# Patient Record
Sex: FEMALE | Race: BLACK OR AFRICAN AMERICAN | NOT HISPANIC OR LATINO | Employment: OTHER | URBAN - METROPOLITAN AREA
[De-identification: names, ages, dates, MRNs, and addresses within clinical notes are randomized per-mention and may not be internally consistent; named-entity substitution may affect disease eponyms.]

---

## 2021-07-18 ENCOUNTER — HOSPITAL ENCOUNTER (EMERGENCY)
Facility: HOSPITAL | Age: 71
Discharge: HOME | End: 2021-07-18
Attending: EMERGENCY MEDICINE | Admitting: EMERGENCY MEDICINE

## 2021-07-18 ENCOUNTER — APPOINTMENT (OUTPATIENT)
Dept: CARDIOLOGY | Facility: HOSPITAL | Age: 71
End: 2021-07-18

## 2021-07-18 VITALS
SYSTOLIC BLOOD PRESSURE: 139 MMHG | DIASTOLIC BLOOD PRESSURE: 90 MMHG | TEMPERATURE: 97.7 F | HEART RATE: 76 BPM | HEIGHT: 65 IN | OXYGEN SATURATION: 100 % | RESPIRATION RATE: 18 BRPM

## 2021-07-18 DIAGNOSIS — M77.8 TENDINITIS OF LEFT ELBOW: Primary | ICD-10-CM

## 2021-07-18 LAB
BH CV UPPER VENOUS LEFT AXILLARY AUGMENT: NORMAL
BH CV UPPER VENOUS LEFT AXILLARY COMPETENT: NORMAL
BH CV UPPER VENOUS LEFT AXILLARY COMPRESS: NORMAL
BH CV UPPER VENOUS LEFT AXILLARY PHASIC: NORMAL
BH CV UPPER VENOUS LEFT AXILLARY SPONT: NORMAL
BH CV UPPER VENOUS LEFT BASILIC FOREARM COMPRESS: NORMAL
BH CV UPPER VENOUS LEFT BASILIC UPPER COMPRESS: NORMAL
BH CV UPPER VENOUS LEFT BRACHIAL COMPRESS: NORMAL
BH CV UPPER VENOUS LEFT CEPHALIC FOREARM COMPRESS: NORMAL
BH CV UPPER VENOUS LEFT CEPHALIC UPPER COMPRESS: NORMAL
BH CV UPPER VENOUS LEFT INTERNAL JUGULAR AUGMENT: NORMAL
BH CV UPPER VENOUS LEFT INTERNAL JUGULAR COMPETENT: NORMAL
BH CV UPPER VENOUS LEFT INTERNAL JUGULAR COMPRESS: NORMAL
BH CV UPPER VENOUS LEFT INTERNAL JUGULAR PHASIC: NORMAL
BH CV UPPER VENOUS LEFT INTERNAL JUGULAR SPONT: NORMAL
BH CV UPPER VENOUS LEFT RADIAL COMPRESS: NORMAL
BH CV UPPER VENOUS LEFT SUBCLAVIAN AUGMENT: NORMAL
BH CV UPPER VENOUS LEFT SUBCLAVIAN COMPETENT: NORMAL
BH CV UPPER VENOUS LEFT SUBCLAVIAN COMPRESS: NORMAL
BH CV UPPER VENOUS LEFT SUBCLAVIAN PHASIC: NORMAL
BH CV UPPER VENOUS LEFT SUBCLAVIAN SPONT: NORMAL
BH CV UPPER VENOUS LEFT ULNAR COMPRESS: NORMAL
BH CV UPPER VENOUS RIGHT INTERNAL JUGULAR AUGMENT: NORMAL
BH CV UPPER VENOUS RIGHT INTERNAL JUGULAR COMPETENT: NORMAL
BH CV UPPER VENOUS RIGHT INTERNAL JUGULAR COMPRESS: NORMAL
BH CV UPPER VENOUS RIGHT INTERNAL JUGULAR PHASIC: NORMAL
BH CV UPPER VENOUS RIGHT INTERNAL JUGULAR SPONT: NORMAL
BH CV UPPER VENOUS RIGHT SUBCLAVIAN AUGMENT: NORMAL
BH CV UPPER VENOUS RIGHT SUBCLAVIAN COMPETENT: NORMAL
BH CV UPPER VENOUS RIGHT SUBCLAVIAN COMPRESS: NORMAL
BH CV UPPER VENOUS RIGHT SUBCLAVIAN PHASIC: NORMAL
BH CV UPPER VENOUS RIGHT SUBCLAVIAN SPONT: NORMAL

## 2021-07-18 PROCEDURE — 99283 EMERGENCY DEPT VISIT LOW MDM: CPT

## 2021-07-18 PROCEDURE — 93971 EXTREMITY STUDY: CPT

## 2021-07-18 NOTE — ED TRIAGE NOTES
Pt has mass to left A/C region on arm x 4 days, pt went to [facility] told to come to ER to rule out DVT. Pt denies hx of dvt or injury.

## 2021-07-18 NOTE — ED NOTES
Patient presents to ED from home, notes she went to [facility] and was told to come to ED for further evaluation in regards to left arm pain, and mild swelling to medical side of LUE fold.  Patient notes pain for 4 days.  Notes trip to [place] x1 month ago.  Patient denies any recent peripheral sticks, denies any known injury.  Denies any numbness or tingling. Pulses are strong and equal bilaterally.  Denies shortness of breath.  ABC's intact.  Denies any blood thinners.  VSS.  Notes she did not take prescribed blood pressure medication today.  B/P is 139/90.  Patient is unable to recall names of medications nor dosages.  Patient asked if she could find out said medication information.  Patient informed to call staff when she knew.  NAD noted. Patient wearing mask, nurse wearing mask and protective eyewear during care and assessment.  Hand hygiene performed prior to and post care.      Luke Monsivais RN  07/18/21 3637

## 2021-07-18 NOTE — ED PROVIDER NOTES
EMERGENCY DEPARTMENT ENCOUNTER    Room Number:  09/09  Date of encounter:  7/18/2021  PCP: No primary care provider on file.  Historian: Pt    Patient was placed in face mask during triage process. Patient was wearing facemask when I entered the room and throughout our encounter. I wore full protective equipment throughout this patient encounter including a face mask, eye protection, and gloves. Hand hygiene was performed before donning protective equipment and again following doffing of PPE after leaving the room.    HPI:  Chief Complaint: Left arm pain  A complete HPI/ROS/PMH/PSH/SH/FH are unobtainable due to: N/A   Context: Lakia Light is a 70 y.o. female who presents to the ED c/o gradual developing left arm pain overlying the medial portion of the left antecubital fossa/elbow.  This is been gradually developing over 3 days.  No known trauma.  No erythema.  No radiation proximally or distally.  She denies any fevers or open wounds.  No change in motor or sensory of the left upper extremity.  Patient denies fever, cough, chest pain, shortness of breath.  No history of similar.  Discomfort currently 5 out of 10.  No clear exacerbating or relieving factors other than it hurts to touch.    MEDICAL HISTORY REVIEW  History hypertension.  No prior history cancer, DVT, blood dyscrasia.    PAST MEDICAL HISTORY  Active Ambulatory Problems     Diagnosis Date Noted   • No Active Ambulatory Problems     Resolved Ambulatory Problems     Diagnosis Date Noted   • No Resolved Ambulatory Problems     No Additional Past Medical History         PAST SURGICAL HISTORY  No past surgical history on file.      FAMILY HISTORY  No family history on file.      SOCIAL HISTORY         ALLERGIES  Patient has no known allergies.        REVIEW OF SYSTEMS  Review of Systems     All systems reviewed and negative except for those discussed in HPI.       PHYSICAL EXAM    I have reviewed the triage vital signs and nursing notes.    ED Triage Vitals  [07/18/21 1820]   Temp Heart Rate Resp BP SpO2   97.7 °F (36.5 °C) 76 18 -- 100 %      Temp src Heart Rate Source Patient Position BP Location FiO2 (%)   Tympanic -- -- -- --       Physical Exam    Physical Exam   Constitutional: No distress.   HENT:  Head: Normocephalic and atraumatic.   Oropharynx: Mucous membranes are moist.   Eyes: No scleral icterus. No conjunctival pallor.  Neck: Painless range of motion noted. Neck supple.   Cardiovascular: Normal rate, regular rhythm and intact distal pulses.  Pulmonary/Chest: No respiratory distress. There are no wheezes, no rhonchi, and no rales.    Musculoskeletal: Soft tissue tenderness with no significant induration or erythema overlying the medial portion of left AC.  No palpable cord.  Normal left axilla.  Normal external findings of the left upper extremity as compared to the right with no swelling, erythema, or limitations in range of motion.  Neurological: Alert.  Baseline strength and sensation noted.   Skin: Skin is pink, warm, and dry. No pallor.   Psychiatric: Mood and affect normal.   Nursing note and vitals reviewed.    LAB RESULTS  Recent Results (from the past 24 hour(s))   Duplex venous upper extremity left    Collection Time: 07/18/21  7:16 PM   Result Value Ref Range    Right Internal Jugular Augment Y     Right Internal Jugular Competent Y     Right Internal Jugular Compress C     Right Internal Jugular Phasic Y     Right Internal Jugular Spont Y     Left Internal Jugular Augment Y     Left Internal Jugular Competent Y     Left Internal Jugular Compress C     Left Internal Jugular Phasic Y     Left Internal Jugular Spont Y     Right Subclavian Augment Y     Right Subclavian Competent Y     Right Subclavian Compress C     Right Subclavian Phasic Y     Right Subclavian Spont Y     Left Subclavian Augment Y     Left Subclavian Competent Y     Left Subclavian Compress C     Left Subclavian Phasic Y     Left Subclavian Spont Y     Left Axillary Augment Y      Left Axillary Competent Y     Left Axillary Compress C     Left Axillary Phasic Y     Left Axillary Spont Y     Left Brachial Compress C     Left Radial Compress C     Left Ulnar Compress C     Left Basilic Upper Compress C     Left Basilic Forearm Compress C     Left Cephalic Upper Compress C     Left Cephalic Forearm Compress C        Ordered the above labs and independently reviewed the results.        RADIOLOGY  No Radiology Exams Resulted Within Past 24 Hours    I ordered the above noted radiological studies. Reviewed by me and discussed with radiologist.  See dictation for official radiology interpretation.      PROCEDURES    Procedures        MEDICATIONS GIVEN IN ER    Medications - No data to display      PROGRESS, DATA ANALYSIS, CONSULTS, AND MEDICAL DECISION MAKING    My differential diagnosis of the upper extremity includes but is not limited to contusions of the shoulder, forearm, arm, wrist, elbow or hand, dislocations of shoulder, elbow, wrist, digits, shoulder sprain, elbow sprain, wrist sprain, digit sprain, shoulder strain, arm strain, forearm strain, elbow strain, wrist strain, hand sprain, digit strain, lacerations of the upper extremity, fractures both closed and open of radius, ulna and humerus, cellulitis or abscess, cervical radiculopathy, radial nerve palsy, neurogenic upper extremity pain.      All labs have been independently reviewed by me.  All radiology studies have been reviewed by me and discussed with radiologist dictating the report.   EKG's independently viewed and interpreted by me.  Discussion below represents my analysis of pertinent findings related to patient's condition, differential diagnosis, treatment plan and final disposition.      ED Course as of Jul 18 1933   Sun Jul 18, 2021   1840 Patient declines offer for analgesic    [RS]   1930 Late entry:  Preliminary duplex left upper extremity result from vascular tech, Alondra-negative for DVT and SVT.    [RS]   1930 Patient  updated with findings.  Recommend anti-inflammatory therapy with topical diclofenac gel and light compression with Ace wrap.  Reviewed red flags which would indicate need for reevaluation.  Patient expresses understanding agreement with plan.    [RS]      ED Course User Index  [RS] Natan Lorenzana MD       AS OF 19:33 EDT VITALS:    BP - 139/90  HR - 76  TEMP - 97.7 °F (36.5 °C) (Tympanic)  O2 SATS - 100%        DIAGNOSIS  Final diagnoses:   Tendinitis of left elbow         DISPOSITION  DISCHARGE    Patient discharged in stable condition.    Reviewed implications of results, diagnosis, meds, responsibility to follow up, warning signs and symptoms of possible worsening, potential complications and reasons to return to ER.    Patient/Family voiced understanding of above instructions.    Discussed plan for discharge, as there is no emergent indication for admission. Patient referred to primary care provider for regular health maintenance. Pt/family is agreeable and understands need for follow up and possible repeat testing.  Pt is aware that discharge does not mean that nothing is wrong but it indicates no emergency is present that requires admission and they must continue care with follow-up as given below or physician of their choice.     FOLLOW-UP  Your primary care provider    Schedule an appointment as soon as possible for a visit in 3 days  If symptoms fail to improve    [facility] Emergency Department  [address] [facility] [address] [phone]  Go to   As needed, If symptoms worsen         Medication List      New Prescriptions    Diclofenac Sodium 1 % gel gel  Commonly known as: VOLTAREN  Apply 4 g topically to the appropriate area as directed 2 (Two) Times a Day.           Where to Get Your Medications      You can get these medications from any pharmacy    Bring a paper prescription for each of these medications  · Diclofenac Sodium 1 % gel gel            Natan Lorenzana,  MD  07/18/21 4783

## 2022-07-25 ENCOUNTER — HOSPITAL ENCOUNTER (EMERGENCY)
Facility: HOSPITAL | Age: 72
Discharge: HOME | End: 2022-07-25
Attending: EMERGENCY MEDICINE | Admitting: EMERGENCY MEDICINE

## 2022-07-25 ENCOUNTER — APPOINTMENT (OUTPATIENT)
Dept: CARDIOLOGY | Facility: HOSPITAL | Age: 72
End: 2022-07-25

## 2022-07-25 VITALS
DIASTOLIC BLOOD PRESSURE: 79 MMHG | RESPIRATION RATE: 16 BRPM | HEIGHT: 65 IN | HEART RATE: 65 BPM | TEMPERATURE: 97.7 F | WEIGHT: 198 LBS | BODY MASS INDEX: 32.99 KG/M2 | SYSTOLIC BLOOD PRESSURE: 126 MMHG | OXYGEN SATURATION: 98 %

## 2022-07-25 DIAGNOSIS — M79.604 ACUTE PAIN OF RIGHT LOWER EXTREMITY: Primary | ICD-10-CM

## 2022-07-25 LAB
BH CV LOWER VASCULAR LEFT COMMON FEMORAL AUGMENT: NORMAL
BH CV LOWER VASCULAR LEFT COMMON FEMORAL COMPETENT: NORMAL
BH CV LOWER VASCULAR LEFT COMMON FEMORAL COMPRESS: NORMAL
BH CV LOWER VASCULAR LEFT COMMON FEMORAL PHASIC: NORMAL
BH CV LOWER VASCULAR LEFT COMMON FEMORAL SPONT: NORMAL
BH CV LOWER VASCULAR RIGHT COMMON FEMORAL AUGMENT: NORMAL
BH CV LOWER VASCULAR RIGHT COMMON FEMORAL COMPETENT: NORMAL
BH CV LOWER VASCULAR RIGHT COMMON FEMORAL COMPRESS: NORMAL
BH CV LOWER VASCULAR RIGHT COMMON FEMORAL PHASIC: NORMAL
BH CV LOWER VASCULAR RIGHT COMMON FEMORAL SPONT: NORMAL
BH CV LOWER VASCULAR RIGHT DISTAL FEMORAL COMPRESS: NORMAL
BH CV LOWER VASCULAR RIGHT GASTRONEMIUS COMPRESS: NORMAL
BH CV LOWER VASCULAR RIGHT GREATER SAPH AK COMPRESS: NORMAL
BH CV LOWER VASCULAR RIGHT GREATER SAPH BK COMPRESS: NORMAL
BH CV LOWER VASCULAR RIGHT LESSER SAPH COMPRESS: NORMAL
BH CV LOWER VASCULAR RIGHT MID FEMORAL AUGMENT: NORMAL
BH CV LOWER VASCULAR RIGHT MID FEMORAL COMPETENT: NORMAL
BH CV LOWER VASCULAR RIGHT MID FEMORAL COMPRESS: NORMAL
BH CV LOWER VASCULAR RIGHT MID FEMORAL PHASIC: NORMAL
BH CV LOWER VASCULAR RIGHT MID FEMORAL SPONT: NORMAL
BH CV LOWER VASCULAR RIGHT PERONEAL COMPRESS: NORMAL
BH CV LOWER VASCULAR RIGHT POPLITEAL AUGMENT: NORMAL
BH CV LOWER VASCULAR RIGHT POPLITEAL COMPETENT: NORMAL
BH CV LOWER VASCULAR RIGHT POPLITEAL COMPRESS: NORMAL
BH CV LOWER VASCULAR RIGHT POPLITEAL PHASIC: NORMAL
BH CV LOWER VASCULAR RIGHT POPLITEAL SPONT: NORMAL
BH CV LOWER VASCULAR RIGHT POSTERIOR TIBIAL COMPRESS: NORMAL
BH CV LOWER VASCULAR RIGHT PROFUNDA FEMORAL COMPRESS: NORMAL
BH CV LOWER VASCULAR RIGHT PROXIMAL FEMORAL COMPRESS: NORMAL
BH CV LOWER VASCULAR RIGHT SAPHENOFEMORAL JUNCTION COMPRESS: NORMAL
MAXIMAL PREDICTED HEART RATE: 149 BPM
STRESS TARGET HR: 127 BPM

## 2022-07-25 PROCEDURE — 99283 EMERGENCY DEPT VISIT LOW MDM: CPT

## 2022-07-25 PROCEDURE — 93971 EXTREMITY STUDY: CPT

## 2022-07-25 RX ORDER — ACETAMINOPHEN 500 MG
1000 TABLET ORAL ONCE
Status: COMPLETED | OUTPATIENT
Start: 2022-07-25 | End: 2022-07-25

## 2022-07-25 RX ORDER — NAPROXEN 500 MG/1
500 TABLET ORAL 2 TIMES DAILY WITH MEALS
Qty: 20 TABLET | Refills: 0 | Status: SHIPPED | OUTPATIENT
Start: 2022-07-25

## 2022-07-25 RX ADMIN — ACETAMINOPHEN 1000 MG: 500 TABLET ORAL at 17:42

## 2022-07-25 NOTE — DISCHARGE INSTRUCTIONS
Try ice on and off to right thigh    Avoid heating pad    Follow up with Primary Care    Return Precautions    Although you are being discharged from the ED today, I encourage you to return for worsening symptoms.  Things can, and do, change such that treatment at home with medication may not be adequate.      Specifically, return for any of the following:    Chest pain, shortness of breath, pain or nausea and vomiting not controlled by medications provided.    Please make a follow up with your Primary Care Provider for a blood pressure recheck.

## 2022-07-25 NOTE — ED NOTES
"Pt reports cramping pains from R ankle up to R thigh x \"a while\" worse in the last few days. States has not had pains \"quite like this before'. Not on blood thinners. Jaycee CP, SOA. able to ambulate independently, NKI. Pt does report + recent travel.   "

## 2022-07-25 NOTE — PROGRESS NOTES
Preliminary negative results of today's right lower extremity venous duplex were called to [facility]ao.

## 2022-07-25 NOTE — ED TRIAGE NOTES
Pt to ER from home. Ambulatory to triage desk. Complains of R thigh and calf pain. No known injury. Pain started yesterday evening, worsening today.     Pt wearing mask, this RN in PPE

## 2022-07-25 NOTE — ED PROVIDER NOTES
EMERGENCY DEPARTMENT ENCOUNTER    Room Number:  B10/10  Date seen:  7/25/2022  Time seen: 17:17 EDT  PCP: Nia Mason MD  Historian: pt    HPI:  Chief complaint:right thigh and calf pain  A complete HPI/ROS/PMH/PSH/SH/FH are unobtainable due to: n/a  Context:Lakia Light is a 71 y.o. female with past medical history significant for hypertension who presents to the ED with c/o 1 day of pain to the right thigh and calf with no known injury.  She reports that she is able to ambulate but sometimes she feels as though the leg is catching.  She has not tried anything at home for the pain.  She reports it almost feels muscular in nature but she also endorses a recent car drive back and forth to [place] as well as [place].  She denies any chest pain, chest tightness and has no shortness of breath.  She has not had this problem before.  The pain is made worse by ambulating and better with rest    Patient was placed in face mask in first look. Patient was wearing facemask when I entered the room and throughout our encounter. I wore full protective equipment throughout this patient encounter including a N95 face mask, eye shield and gloves. Hand hygiene/washing of hands was performed before donning protective equipment and after removal when leaving the room.    MEDICAL RECORD REVIEW    ALLERGIES  Patient has no known allergies.    PAST MEDICAL HISTORY  Active Ambulatory Problems     Diagnosis Date Noted   • No Active Ambulatory Problems     Resolved Ambulatory Problems     Diagnosis Date Noted   • No Resolved Ambulatory Problems     No Additional Past Medical History       PAST SURGICAL HISTORY  No past surgical history on file.    FAMILY HISTORY  No family history on file.    SOCIAL HISTORY  Social History     Socioeconomic History   • Marital status:        REVIEW OF SYSTEMS  Review of Systems    All systems reviewed and negative except for those discussed in HPI.     PHYSICAL EXAM    ED Triage Vitals   Temp  Heart Rate Resp BP SpO2   07/25/22 1640 07/25/22 1640 07/25/22 1640 07/25/22 1641 07/25/22 1640   97.7 °F (36.5 °C) 81 17 108/91 98 %      Temp src Heart Rate Source Patient Position BP Location FiO2 (%)   07/25/22 1640 -- -- 07/25/22 1640 --   Tympanic   Left arm      Physical Exam    I have reviewed the triage vital signs and nursing notes.      GENERAL: not distressed, pleasant and cooperative  HENT: nares patent  EYES: no scleral icterus  NECK: no ROM limitations  CV: regular rhythm, regular rate, no murmur  RESPIRATORY: normal effort, CTAB  ABDOMEN: soft  : deferred  MUSCULOSKELETAL: no deformity to RLE.  She is able to stand and bear weight and take steps without problems.  Pedal pulses are 2+ DP/PT bilaterally.  No loss of sensation to RLE.  Compartments are soft.  No edema  NEURO: alert, moves all extremities, follows commands  SKIN: warm, dry    LAB RESULTS  Recent Results (from the past 24 hour(s))   Duplex Venous Lower Extremity - Right    Collection Time: 07/25/22  6:40 PM   Result Value Ref Range    Target HR (85%) 127 bpm    Max. Pred. HR (100%) 149 bpm    Right Common Femoral Spont Y     Right Common Femoral Phasic Y     Right Common Femoral Augment Y     Right Common Femoral Competent Y     Right Common Femoral Compress C     Right Saphenofemoral Junction Compress C     Right Profunda Femoral Compress C     Right Proximal Femoral Compress C     Right Mid Femoral Spont Y     Right Mid Femoral Phasic Y     Right Mid Femoral Augment Y     Right Mid Femoral Competent Y     Right Mid Femoral Compress C     Right Distal Femoral Compress C     Right Popliteal Spont Y     Right Popliteal Phasic Y     Right Popliteal Augment Y     Right Popliteal Competent Y     Right Popliteal Compress C     Right Posterior Tibial Compress C     Right Peroneal Compress C     Right Gastronemius Compress C     Right Greater Saph AK Compress C     Right Greater Saph BK Compress C     Right Lesser Saph Compress C     Left  Common Femoral Spont Y     Left Common Femoral Phasic Y     Left Common Femoral Augment Y     Left Common Femoral Competent Y     Left Common Femoral Compress C          RADIOLOGY RESULTS  No Radiology Exams Resulted Within Past 24 Hours       PROGRESS, DATA ANALYSIS, CONSULTS AND MEDICAL DECISION MAKING  All labs have been independently reviewed by me.  All radiology studies have been reviewed by me and discussed with radiologist dictating the report.  EKG's independently viewed and interpreted by me unless stated otherwise. Discussion below represents my analysis of pertinent findings related to patient's condition, differential diagnosis, treatment plan and final disposition.     ED Course as of 07/25/22 1931 Mon Jul 25, 2022 1920 I discussed vascular study results with ultrasound tech.  Negative for DVT and negative for SVT [EW]   1928 I updated the patient that vascular imaging is negative.  She is able to ambulate.  Compartments are soft.  No loss of sensory or motor function.  She is to take antiinflammatories as needed for pain   [EW]      ED Course User Index  [EW] Tang Jennifer Lovinge, [facility]     DDX: right thigh/calf muscle strain, DVT RLE,      Reviewed pt's history and workup with Dr. Taylor.  After a bedside evaluation, Dr. Taylor agrees with the plan of care.    The patient's history, physical exam, and lab findings were discussed with the physician, who also performed a face to face history and physical exam.  I discussed all results and noted any abnormalities with patient.  Discussed absoute need to recheck abnormalities with their family physician.  I answered any of the patient's questions.  Discussed plan for discharge, as there is no emergent indication for admission.  Pt is agreeable and understands need for follow up and repeat testing.  Pt is aware that discharge does not mean that nothing is wrong but it indicates no emergency is present and they must continue care with their family physician.   "Pt is discharged with instructions to follow up with primary care doctor to have their blood pressure rechecked.     Disposition vitals:  /91   Pulse 81   Temp 97.7 °F (36.5 °C) (Tympanic)   Resp 17   Ht 165.1 cm (65\")   Wt 89.8 kg (198 lb)   SpO2 98%   BMI 32.95 kg/m²       DIAGNOSIS  Final diagnoses:   Acute pain of right lower extremity       FOLLOW UP   Nia Mason MD  [address] [place] [address]  [phone]    Schedule an appointment as soon as possible for a visit in 1 day  As needed         Jennifer Beckwith, APRN  07/25/22 1931    "

## 2022-11-10 ENCOUNTER — HOSPITAL ENCOUNTER (EMERGENCY)
Facility: HOSPITAL | Age: 72
Discharge: HOME | End: 2022-11-10
Attending: EMERGENCY MEDICINE | Admitting: EMERGENCY MEDICINE

## 2022-11-10 ENCOUNTER — APPOINTMENT (OUTPATIENT)
Dept: GENERAL RADIOLOGY | Facility: HOSPITAL | Age: 72
End: 2022-11-10

## 2022-11-10 ENCOUNTER — APPOINTMENT (OUTPATIENT)
Dept: CARDIOLOGY | Facility: HOSPITAL | Age: 72
End: 2022-11-10

## 2022-11-10 VITALS
HEART RATE: 85 BPM | RESPIRATION RATE: 16 BRPM | DIASTOLIC BLOOD PRESSURE: 79 MMHG | SYSTOLIC BLOOD PRESSURE: 137 MMHG | OXYGEN SATURATION: 98 % | TEMPERATURE: 98.6 F

## 2022-11-10 DIAGNOSIS — M79.89 RIGHT LEG SWELLING: Primary | ICD-10-CM

## 2022-11-10 DIAGNOSIS — M79.661 RIGHT CALF PAIN: ICD-10-CM

## 2022-11-10 LAB
BH CV LOWER VASCULAR LEFT COMMON FEMORAL AUGMENT: NORMAL
BH CV LOWER VASCULAR LEFT COMMON FEMORAL COMPETENT: NORMAL
BH CV LOWER VASCULAR LEFT COMMON FEMORAL COMPRESS: NORMAL
BH CV LOWER VASCULAR LEFT COMMON FEMORAL PHASIC: NORMAL
BH CV LOWER VASCULAR LEFT COMMON FEMORAL SPONT: NORMAL
BH CV LOWER VASCULAR RIGHT COMMON FEMORAL AUGMENT: NORMAL
BH CV LOWER VASCULAR RIGHT COMMON FEMORAL COMPETENT: NORMAL
BH CV LOWER VASCULAR RIGHT COMMON FEMORAL COMPRESS: NORMAL
BH CV LOWER VASCULAR RIGHT COMMON FEMORAL PHASIC: NORMAL
BH CV LOWER VASCULAR RIGHT COMMON FEMORAL SPONT: NORMAL
BH CV LOWER VASCULAR RIGHT DISTAL FEMORAL COMPRESS: NORMAL
BH CV LOWER VASCULAR RIGHT GASTRONEMIUS COMPRESS: NORMAL
BH CV LOWER VASCULAR RIGHT GREATER SAPH AK COMPRESS: NORMAL
BH CV LOWER VASCULAR RIGHT GREATER SAPH BK COMPRESS: NORMAL
BH CV LOWER VASCULAR RIGHT LESSER SAPH COMPRESS: NORMAL
BH CV LOWER VASCULAR RIGHT MID FEMORAL AUGMENT: NORMAL
BH CV LOWER VASCULAR RIGHT MID FEMORAL COMPETENT: NORMAL
BH CV LOWER VASCULAR RIGHT MID FEMORAL COMPRESS: NORMAL
BH CV LOWER VASCULAR RIGHT MID FEMORAL PHASIC: NORMAL
BH CV LOWER VASCULAR RIGHT MID FEMORAL SPONT: NORMAL
BH CV LOWER VASCULAR RIGHT PERONEAL COMPRESS: NORMAL
BH CV LOWER VASCULAR RIGHT POPLITEAL AUGMENT: NORMAL
BH CV LOWER VASCULAR RIGHT POPLITEAL COMPETENT: NORMAL
BH CV LOWER VASCULAR RIGHT POPLITEAL COMPRESS: NORMAL
BH CV LOWER VASCULAR RIGHT POPLITEAL PHASIC: NORMAL
BH CV LOWER VASCULAR RIGHT POPLITEAL SPONT: NORMAL
BH CV LOWER VASCULAR RIGHT POSTERIOR TIBIAL COMPRESS: NORMAL
BH CV LOWER VASCULAR RIGHT PROFUNDA FEMORAL COMPRESS: NORMAL
BH CV LOWER VASCULAR RIGHT PROXIMAL FEMORAL COMPRESS: NORMAL
BH CV LOWER VASCULAR RIGHT SAPHENOFEMORAL JUNCTION COMPRESS: NORMAL
MAXIMAL PREDICTED HEART RATE: 148 BPM
STRESS TARGET HR: 126 BPM

## 2022-11-10 PROCEDURE — 99282 EMERGENCY DEPT VISIT SF MDM: CPT

## 2022-11-10 PROCEDURE — 73590 X-RAY EXAM OF LOWER LEG: CPT

## 2022-11-10 PROCEDURE — 93971 EXTREMITY STUDY: CPT

## 2022-11-10 NOTE — ED PROVIDER NOTES
EMERGENCY DEPARTMENT ENCOUNTER  I wore full protective equipment throughout this patient encounter including a N95 mask, eye shield, gown and gloves. Hand hygiene was performed before donning protective equipment and after removal when leaving the room.    Room Number:  40/40  Date of encounter:  11/10/2022  PCP: Nia Mason MD    HPI:  Context: Lakia Light is a 72 y.o. female who presents to the ED c/o chief complaint of leg injury a month ago.  Patient reports that she had trauma to her leg a month ago, no fractures.  Patient reports that since that time she has continued to have intermittent swelling, coming and going, predominantly in her medial calf.  Patient complains of pain coming and going, area is tender to touch.  Patient denies any redness warmth or heat, no drainage.  No skin lesions.  Patient denies any knee or ankle pain.  Patient denies any chest pain or shortness of breath, no fever shakes chills or night sweats.  Patient denies any history of blood clots.    MEDICAL HISTORY REVIEW  Reviewed in EPIC    PAST MEDICAL HISTORY  Active Ambulatory Problems     Diagnosis Date Noted   • No Active Ambulatory Problems     Resolved Ambulatory Problems     Diagnosis Date Noted   • No Resolved Ambulatory Problems     No Additional Past Medical History       PAST SURGICAL HISTORY  History reviewed. No pertinent surgical history.    FAMILY HISTORY  History reviewed. No pertinent family history.    SOCIAL HISTORY  Social History     Socioeconomic History   • Marital status:        ALLERGIES  Patient has no known allergies.    The patient's allergies have been reviewed    REVIEW OF SYSTEMS  All systems reviewed and negative except for those discussed in HPI.     PHYSICAL EXAM  I have reviewed the triage vital signs and nursing notes.  ED Triage Vitals [11/10/22 1404]   Temp Heart Rate Resp BP SpO2   98.6 °F (37 °C) 96 16 -- 96 %      Temp src Heart Rate Source Patient Position BP Location FiO2 (%)    Tympanic Monitor -- -- --       General: No acute distress.  HENT: NCAT, PERRL, Nares patent.  Eyes: no scleral icterus.  Neck: trachea midline, no ROM limitations.  CV: regular rhythm, regular rate.  Respiratory: normal effort, CTAB.  Abdomen: soft, nondistended, NTTP, no rebound tenderness, no guarding or rigidity.  Musculoskeletal: no deformity.  Right lower extremity: Patient has scars healed from prior injury, no apparent current trauma, no appreciable swelling, no redness warmth or induration, no palpable cords, negative Homans.  Knee is normal in appearance, no swelling or effusion, full range of motion.  Neuro: alert, moves all extremities, follows commands.  Skin: warm, dry.    LAB RESULTS  Recent Results (from the past 24 hour(s))   Duplex Venous Lower Extremity - Right CAR    Collection Time: 11/10/22  3:27 PM   Result Value Ref Range    Target HR (85%) 126 bpm    Max. Pred. HR (100%) 148 bpm    Right Common Femoral Spont Y     Right Common Femoral Competent Y     Right Common Femoral Phasic Y     Right Common Femoral Compress C     Right Common Femoral Augment Y     Right Saphenofemoral Junction Compress C     Right Profunda Femoral Compress C     Right Proximal Femoral Compress C     Right Mid Femoral Spont Y     Right Mid Femoral Competent Y     Right Mid Femoral Phasic Y     Right Mid Femoral Compress C     Right Mid Femoral Augment Y     Right Distal Femoral Compress C     Right Popliteal Spont Y     Right Popliteal Competent Y     Right Popliteal Phasic Y     Right Popliteal Compress C     Right Popliteal Augment Y     Right Posterior Tibial Compress C     Right Peroneal Compress C     Right Gastronemius Compress C     Right Greater Saph AK Compress C     Right Greater Saph BK Compress C     Right Lesser Saph Compress C     Left Common Femoral Spont Y     Left Common Femoral Competent Y     Left Common Femoral Phasic Y     Left Common Femoral Compress C     Left Common Femoral Augment Y        I  ordered the above labs and reviewed the results.    RADIOLOGY  No Radiology Exams Resulted Within Past 24 Hours    I ordered the above noted radiological studies. I reviewed the images and results. I agree with the radiologist interpretation.    PROCEDURES  Procedures    MEDICATIONS GIVEN IN ER  Medications - No data to display    PROGRESS, DATA ANALYSIS, CONSULTS, AND MEDICAL DECISION MAKING  A complete history and physical exam have been performed.  All available laboratory and imaging results have been reviewed by myself prior to disposition.    MDM  After the initial H&P, I discussed pertinent information from history and physical exam with patient/family.  Discussed differential diagnosis.  Discussed plan for ED evaluation/workup/treatment.  All questions answered.  Patient/family is agreeable with plan.  ED Course as of 11/10/22 1613   Thu Nov 10, 2022   1532 Preliminary report per ultrasound is patient is negative for DVT/SVT in right lower extremity. [JG]   1533 Patient presents with right leg pain and swelling.  No appreciable swelling on exam, no signs of infection.  Patient complains of muscular pain, no bony tenderness.  Ultrasound images negative for DVT.  Patient is well-appearing appears appropriate for discharge with outpatient follow-up.  Discussed use of compression stockings to assist with swelling, discussed prescription for Voltaren for treatment of pain.  Extensive discussion return precautions, discharging with primary care follow-up. [JG]   1554 Patient now requesting leg x-rays, will obtain tibia/fib x-ray. [JG]   1611 I reviewed right tib-fib x-rays in PACS, my interpretation is: No fracture, no dislocation, normal soft tissue. [JG]      ED Course User Index  [JG] Chin Davies MD       AS OF 16:13 EST VITALS:    BP - 137/79  HR - 85  TEMP - 98.6 °F (37 °C) (Tympanic)  O2 SATS - 98%    DIAGNOSIS  Final diagnoses:   Right leg swelling   Right calf pain          DISPOSITION  DISCHARGE    Patient discharged in stable condition.    Reviewed implications of results, diagnosis, meds, responsibility to follow up, warning signs and symptoms of possible worsening, potential complications and reasons to return to ER.    Patient/Family voiced understanding of above instructions.    Discussed plan for discharge, as there is no emergent indication for admission. Patient referred to primary care provider for BP management due to today's BP. Pt/family is agreeable and understands need for follow up and repeat testing.  Pt is aware that discharge does not mean that nothing is wrong but it indicates no emergency is present that requires admission and they must continue care with follow-up as given below or physician of their choice.     FOLLOW-UP  Nia Mason MD  [address] [address]  [phone]    Schedule an appointment as soon as possible for a visit in 2 days  even if well         Where to Get Your Medications      You can get these medications from any pharmacy    Bring a paper prescription for each of these medications  · Diclofenac Sodium 1 % gel gel        Medication List      No changes were made to your prescriptions during this visit.          Chin Davies MD  11/10/22 1535       Chin Davies MD  11/10/22 1615

## 2022-11-10 NOTE — ED NOTES
This RN entered pt room to d/c pt. Pt stated she did not feel comfortable being discharged without x-ray of her leg. Reported this to MD Davies; see orders.

## 2024-07-17 ENCOUNTER — APPOINTMENT (OUTPATIENT)
Dept: CARDIOLOGY | Facility: HOSPITAL | Age: 74
End: 2024-07-17
Payer: MEDICARE

## 2024-07-17 ENCOUNTER — HOSPITAL ENCOUNTER (EMERGENCY)
Facility: HOSPITAL | Age: 74
Discharge: HOME | End: 2024-07-17
Attending: EMERGENCY MEDICINE
Payer: MEDICARE

## 2024-07-17 VITALS
SYSTOLIC BLOOD PRESSURE: 116 MMHG | OXYGEN SATURATION: 98 % | TEMPERATURE: 97.9 F | DIASTOLIC BLOOD PRESSURE: 76 MMHG | HEART RATE: 74 BPM | RESPIRATION RATE: 16 BRPM

## 2024-07-17 DIAGNOSIS — M25.571 ACUTE RIGHT ANKLE PAIN: Primary | ICD-10-CM

## 2024-07-17 LAB
ALBUMIN SERPL-MCNC: 4.3 G/DL (ref 3.5–5.2)
ALBUMIN/GLOB SERPL: 1.3 G/DL
ALP SERPL-CCNC: 57 U/L (ref 39–117)
ALT SERPL W P-5'-P-CCNC: 8 U/L (ref 1–33)
ANION GAP SERPL CALCULATED.3IONS-SCNC: 10.2 MMOL/L (ref 5–15)
AST SERPL-CCNC: 12 U/L (ref 1–32)
BASOPHILS # BLD AUTO: 0.03 10*3/MM3 (ref 0–0.2)
BASOPHILS NFR BLD AUTO: 0.4 % (ref 0–1.5)
BH CV LOWER VASCULAR LEFT COMMON FEMORAL AUGMENT: NORMAL
BH CV LOWER VASCULAR LEFT COMMON FEMORAL COMPETENT: NORMAL
BH CV LOWER VASCULAR LEFT COMMON FEMORAL COMPRESS: NORMAL
BH CV LOWER VASCULAR LEFT COMMON FEMORAL PHASIC: NORMAL
BH CV LOWER VASCULAR LEFT COMMON FEMORAL SPONT: NORMAL
BH CV LOWER VASCULAR RIGHT COMMON FEMORAL AUGMENT: NORMAL
BH CV LOWER VASCULAR RIGHT COMMON FEMORAL COMPETENT: NORMAL
BH CV LOWER VASCULAR RIGHT COMMON FEMORAL COMPRESS: NORMAL
BH CV LOWER VASCULAR RIGHT COMMON FEMORAL PHASIC: NORMAL
BH CV LOWER VASCULAR RIGHT COMMON FEMORAL SPONT: NORMAL
BH CV LOWER VASCULAR RIGHT DISTAL FEMORAL COMPRESS: NORMAL
BH CV LOWER VASCULAR RIGHT GASTRONEMIUS COMPRESS: NORMAL
BH CV LOWER VASCULAR RIGHT GREATER SAPH AK COMPRESS: NORMAL
BH CV LOWER VASCULAR RIGHT GREATER SAPH BK COMPRESS: NORMAL
BH CV LOWER VASCULAR RIGHT LESSER SAPH COMPRESS: NORMAL
BH CV LOWER VASCULAR RIGHT MID FEMORAL AUGMENT: NORMAL
BH CV LOWER VASCULAR RIGHT MID FEMORAL COMPETENT: NORMAL
BH CV LOWER VASCULAR RIGHT MID FEMORAL COMPRESS: NORMAL
BH CV LOWER VASCULAR RIGHT MID FEMORAL PHASIC: NORMAL
BH CV LOWER VASCULAR RIGHT MID FEMORAL SPONT: NORMAL
BH CV LOWER VASCULAR RIGHT PERONEAL COMPRESS: NORMAL
BH CV LOWER VASCULAR RIGHT POPLITEAL AUGMENT: NORMAL
BH CV LOWER VASCULAR RIGHT POPLITEAL COMPETENT: NORMAL
BH CV LOWER VASCULAR RIGHT POPLITEAL COMPRESS: NORMAL
BH CV LOWER VASCULAR RIGHT POPLITEAL PHASIC: NORMAL
BH CV LOWER VASCULAR RIGHT POPLITEAL SPONT: NORMAL
BH CV LOWER VASCULAR RIGHT POSTERIOR TIBIAL COMPRESS: NORMAL
BH CV LOWER VASCULAR RIGHT PROFUNDA FEMORAL COMPRESS: NORMAL
BH CV LOWER VASCULAR RIGHT PROXIMAL FEMORAL COMPRESS: NORMAL
BH CV LOWER VASCULAR RIGHT SAPHENOFEMORAL JUNCTION COMPRESS: NORMAL
BILIRUB SERPL-MCNC: 0.5 MG/DL (ref 0–1.2)
BUN SERPL-MCNC: 14 MG/DL (ref 8–23)
BUN/CREAT SERPL: 17.1 (ref 7–25)
CALCIUM SPEC-SCNC: 9.8 MG/DL (ref 8.6–10.5)
CHLORIDE SERPL-SCNC: 104 MMOL/L (ref 98–107)
CO2 SERPL-SCNC: 25.8 MMOL/L (ref 22–29)
CREAT SERPL-MCNC: 0.82 MG/DL (ref 0.57–1)
DEPRECATED RDW RBC AUTO: 41.8 FL (ref 37–54)
EGFRCR SERPLBLD CKD-EPI 2021: 75.6 ML/MIN/1.73
EOSINOPHIL # BLD AUTO: 0.21 10*3/MM3 (ref 0–0.4)
EOSINOPHIL NFR BLD AUTO: 2.9 % (ref 0.3–6.2)
ERYTHROCYTE [DISTWIDTH] IN BLOOD BY AUTOMATED COUNT: 13.1 % (ref 12.3–15.4)
GLOBULIN UR ELPH-MCNC: 3.3 GM/DL
GLUCOSE SERPL-MCNC: 98 MG/DL (ref 65–99)
HCT VFR BLD AUTO: 39.8 % (ref 34–46.6)
HGB BLD-MCNC: 13 G/DL (ref 12–15.9)
HOLD SPECIMEN: NORMAL
HOLD SPECIMEN: NORMAL
IMM GRANULOCYTES # BLD AUTO: 0.02 10*3/MM3 (ref 0–0.05)
IMM GRANULOCYTES NFR BLD AUTO: 0.3 % (ref 0–0.5)
LYMPHOCYTES # BLD AUTO: 2.27 10*3/MM3 (ref 0.7–3.1)
LYMPHOCYTES NFR BLD AUTO: 31.7 % (ref 19.6–45.3)
MCH RBC QN AUTO: 28.8 PG (ref 26.6–33)
MCHC RBC AUTO-ENTMCNC: 32.7 G/DL (ref 31.5–35.7)
MCV RBC AUTO: 88.2 FL (ref 79–97)
MONOCYTES # BLD AUTO: 0.36 10*3/MM3 (ref 0.1–0.9)
MONOCYTES NFR BLD AUTO: 5 % (ref 5–12)
NEUTROPHILS NFR BLD AUTO: 4.28 10*3/MM3 (ref 1.7–7)
NEUTROPHILS NFR BLD AUTO: 59.7 % (ref 42.7–76)
NRBC BLD AUTO-RTO: 0 /100 WBC (ref 0–0.2)
PLATELET # BLD AUTO: 288 10*3/MM3 (ref 140–450)
PMV BLD AUTO: 10.3 FL (ref 6–12)
POTASSIUM SERPL-SCNC: 3.7 MMOL/L (ref 3.5–5.2)
PROT SERPL-MCNC: 7.6 G/DL (ref 6–8.5)
RBC # BLD AUTO: 4.51 10*6/MM3 (ref 3.77–5.28)
SODIUM SERPL-SCNC: 140 MMOL/L (ref 136–145)
WBC NRBC COR # BLD AUTO: 7.17 10*3/MM3 (ref 3.4–10.8)
WHOLE BLOOD HOLD COAG: NORMAL
WHOLE BLOOD HOLD SPECIMEN: NORMAL

## 2024-07-17 PROCEDURE — 80053 COMPREHEN METABOLIC PANEL: CPT | Performed by: EMERGENCY MEDICINE

## 2024-07-17 PROCEDURE — 93971 EXTREMITY STUDY: CPT | Performed by: SURGERY

## 2024-07-17 PROCEDURE — 85025 COMPLETE CBC W/AUTO DIFF WBC: CPT | Performed by: EMERGENCY MEDICINE

## 2024-07-17 PROCEDURE — 36415 COLL VENOUS BLD VENIPUNCTURE: CPT

## 2024-07-17 PROCEDURE — 93971 EXTREMITY STUDY: CPT

## 2024-07-17 PROCEDURE — 99284 EMERGENCY DEPT VISIT MOD MDM: CPT

## 2024-07-17 RX ORDER — PREDNISONE 20 MG/1
20 TABLET ORAL 2 TIMES DAILY
Qty: 10 TABLET | Refills: 0 | Status: SHIPPED | OUTPATIENT
Start: 2024-07-17

## 2024-07-17 NOTE — ED PROVIDER NOTES
EMERGENCY DEPARTMENT ENCOUNTER    Room Number:  S02/02  Date of encounter:  7/17/2024  PCP: Nia Mason MD  Historian: Patient  Chronic or social conditions impacting care (social determinants of health): None    HPI:  Chief Complaint: Right lower extremity pain, swelling  A complete HPI/ROS/PMH/PSH/SH/FH are unobtainable due to: Nothing    Context: Lakia Light is a 73 y.o. female with a history of hypertension, who presents to the ED c/o acute right lower extremity pain, swelling for 2 days.  Patient reports flying back from [place] on Monday.  Patient complains of pain and swelling mostly to the medial distal right leg.  No significant calf pain or tenderness.  Patient states this pain has been present for months however became worse after a long plane flight.  She denies any chest pain or shortness of breath.  She denies any previous history of DVT or PE.  She takes no blood thinners or hormones.    Review of prior external notes (non-ED):   I reviewed cardiology office visit from 2/7/2023.  Patient followed for hypertension.    Review of prior external test results outside of this encounter:  I reviewed a CMP from 1/26/2023.  Potassium 4.7, creatinine 0.86    PAST MEDICAL HISTORY  Active Ambulatory Problems     Diagnosis Date Noted    No Active Ambulatory Problems     Resolved Ambulatory Problems     Diagnosis Date Noted    No Resolved Ambulatory Problems     No Additional Past Medical History         PAST SURGICAL HISTORY  No past surgical history on file.      FAMILY HISTORY  No family history on file.      SOCIAL HISTORY  Social History     Socioeconomic History    Marital status:          ALLERGIES  Patient has no known allergies.        REVIEW OF SYSTEMS  All systems reviewed and negative except for those discussed in HPI.       PHYSICAL EXAM    I have reviewed the triage vital signs and nursing notes.    ED Triage Vitals   Temp Heart Rate Resp BP SpO2   07/17/24 1039 07/17/24 1039 07/17/24  1039 07/17/24 1041 07/17/24 1039   97.9 °F (36.6 °C) 74 16 116/76 98 %      Temp src Heart Rate Source Patient Position BP Location FiO2 (%)   -- -- -- -- --              Physical Exam  GENERAL: Alert, oriented, not distressed  HENT: head atraumatic, no nuchal rigidity  EYES: no scleral icterus, EOMI  CV: regular rhythm, regular rate, no murmur  RESPIRATORY: normal effort, CTA  ABDOMEN: soft, nontender  MUSCULOSKELETAL: Mild swelling and tenderness to the medial side of the distal right lower leg.  No significant bony tenderness.  Calves are soft and nontender.  Neurovascularly intact distally.  NEURO: alert, moves all extremities, follows commands  SKIN: warm, dry        LAB RESULTS  Recent Results (from the past 24 hour(s))   Comprehensive Metabolic Panel    Collection Time: 07/17/24 10:50 AM    Specimen: Arm, Right; Blood   Result Value Ref Range    Glucose 98 65 - 99 mg/dL    BUN 14 8 - 23 mg/dL    Creatinine 0.82 0.57 - 1.00 mg/dL    Sodium 140 136 - 145 mmol/L    Potassium 3.7 3.5 - 5.2 mmol/L    Chloride 104 98 - 107 mmol/L    CO2 25.8 22.0 - 29.0 mmol/L    Calcium 9.8 8.6 - 10.5 mg/dL    Total Protein 7.6 6.0 - 8.5 g/dL    Albumin 4.3 3.5 - 5.2 g/dL    ALT (SGPT) 8 1 - 33 U/L    AST (SGOT) 12 1 - 32 U/L    Alkaline Phosphatase 57 39 - 117 U/L    Total Bilirubin 0.5 0.0 - 1.2 mg/dL    Globulin 3.3 gm/dL    A/G Ratio 1.3 g/dL    BUN/Creatinine Ratio 17.1 7.0 - 25.0    Anion Gap 10.2 5.0 - 15.0 mmol/L    eGFR 75.6 >60.0 mL/min/1.73   Green Top (Gel)    Collection Time: 07/17/24 10:50 AM   Result Value Ref Range    Extra Tube Hold for add-ons.    Lavender Top    Collection Time: 07/17/24 10:50 AM   Result Value Ref Range    Extra Tube hold for add-on    Gold Top - SST    Collection Time: 07/17/24 10:50 AM   Result Value Ref Range    Extra Tube Hold for add-ons.    Light Blue Top    Collection Time: 07/17/24 10:50 AM   Result Value Ref Range    Extra Tube Hold for add-ons.    CBC Auto Differential    Collection  Time: 07/17/24 10:50 AM    Specimen: Arm, Right; Blood   Result Value Ref Range    WBC 7.17 3.40 - 10.80 10*3/mm3    RBC 4.51 3.77 - 5.28 10*6/mm3    Hemoglobin 13.0 12.0 - 15.9 g/dL    Hematocrit 39.8 34.0 - 46.6 %    MCV 88.2 79.0 - 97.0 fL    MCH 28.8 26.6 - 33.0 pg    MCHC 32.7 31.5 - 35.7 g/dL    RDW 13.1 12.3 - 15.4 %    RDW-SD 41.8 37.0 - 54.0 fl    MPV 10.3 6.0 - 12.0 fL    Platelets 288 140 - 450 10*3/mm3    Neutrophil % 59.7 42.7 - 76.0 %    Lymphocyte % 31.7 19.6 - 45.3 %    Monocyte % 5.0 5.0 - 12.0 %    Eosinophil % 2.9 0.3 - 6.2 %    Basophil % 0.4 0.0 - 1.5 %    Immature Grans % 0.3 0.0 - 0.5 %    Neutrophils, Absolute 4.28 1.70 - 7.00 10*3/mm3    Lymphocytes, Absolute 2.27 0.70 - 3.10 10*3/mm3    Monocytes, Absolute 0.36 0.10 - 0.90 10*3/mm3    Eosinophils, Absolute 0.21 0.00 - 0.40 10*3/mm3    Basophils, Absolute 0.03 0.00 - 0.20 10*3/mm3    Immature Grans, Absolute 0.02 0.00 - 0.05 10*3/mm3    nRBC 0.0 0.0 - 0.2 /100 WBC   Duplex Venous Lower Extremity Right    Collection Time: 07/17/24 12:14 PM   Result Value Ref Range    Right Common Femoral Spont Y     Right Common Femoral Competent Y     Right Common Femoral Phasic Y     Right Common Femoral Compress C     Right Common Femoral Augment Y     Right Saphenofemoral Junction Compress C     Right Profunda Femoral Compress C     Right Proximal Femoral Compress C     Right Mid Femoral Spont Y     Right Mid Femoral Competent Y     Right Mid Femoral Phasic Y     Right Mid Femoral Compress C     Right Mid Femoral Augment Y     Right Distal Femoral Compress C     Right Popliteal Spont Y     Right Popliteal Competent Y     Right Popliteal Phasic Y     Right Popliteal Compress C     Right Popliteal Augment Y     Right Posterior Tibial Compress C     Right Peroneal Compress C     Right Gastronemius Compress C     Right Greater Saph AK Compress C     Right Greater Saph BK Compress C     Right Lesser Saph Compress C     Left Common Femoral Spont Y     Left  Common Femoral Competent Y     Left Common Femoral Phasic Y     Left Common Femoral Compress C     Left Common Femoral Augment Y        Ordered the above labs and independently reviewed the results.        RADIOLOGY  Duplex Venous Lower Extremity Right    Result Date: 7/17/2024    Normal right lower extremity venous duplex scan.      I ordered the above noted radiological studies. Reviewed by me and discussed with radiologist.  See dictation for official radiology interpretation.      MEDICATIONS GIVEN IN ER    Medications - No data to display      ADDITIONAL ORDERS CONSIDERED BUT NOT ORDERED:  Admission was considered but after careful review of the patient's presentation, physical examination, diagnostic results, and response to treatment the patient may be safely discharged with outpatient follow-up.       PROGRESS, DATA ANALYSIS, CONSULTS, AND MEDICAL DECISION MAKING    All labs have been independently interpreted by myself.  All radiology studies have been independently interpreted by myself and discussed with radiologist dictating the report.   EKG's independently interpreted by myself.  Discussion below represents my analysis of pertinent findings related to patient's condition, differential diagnosis, treatment plan and final disposition.    I have discussed case with Dr. Felton, emergency room physician.  He has performed his own bedside examination and agrees with treatment plan.    ED Course as of 07/17/24 2020 Wed Jul 17, 2024   1107 Patient presents with swelling, pain to the distal right medial lower leg.  Patient had plane flight 2 days ago.  No significant overt calf swelling or tenderness.  Neurovascularly intact distally.  Plan for venous ultrasound. [EE]   1109 WBC: 7.17 [TD]   1109 Hemoglobin: 13.0 [TD]   1216 I discussed ultrasound results with Alondra, vascular technician.  Ultrasound is negative for DVT. [TD]   1344 Updated patient on negative workup.  She does complain of some intermittent  right thigh and right lower leg pain as well.  Plan to treat for possible sciatica.  She does have an appointment tomorrow with her primary care doctor.  No focal deficits on exam. [EE]      ED Course User Index  [EE] Arcenio Beasley PA  [TD] Fady Felton II, MD       AS OF 20:20 EDT VITALS:    BP - 116/76  HR - 74  TEMP - 97.9 °F (36.6 °C)  O2 SATS - 98%        DIAGNOSIS  Final diagnoses:   Acute right ankle pain         DISPOSITION  Discharged    Admission was considered but after careful review of the patient's presentation, physical examination, diagnostic results, and response to treatment the patient may be safely discharged with outpatient follow-up.         Dictated utilizing GIDEEN dictation     Arcenio Beasley PA  07/17/24 2020

## 2024-07-17 NOTE — ED PROVIDER NOTES
"MD ATTESTATION NOTE    SHARED VISIT: This visit was performed by BOTH a physician and an APC. The substantive portion of the medical decision making was performed by this attesting physician who made or approved the management plan and takes responsibility for patient management. All studies documented in the APC note (if performed) were independently interpreted by me.    The MICHAELLE and I have discussed this patient's history, physical exam, MDM, and treatment plan.  I have reviewed the documentation and personally had a face to face interaction with the patient. The attached note describes my personal findings.      Lakia Light is a 73 y.o. female who presents to the ED c/o acute right leg pain and swelling to the medial ankle.  States that this pain has been \"going on for a while\" but that it has been worse since Monday.  No preceding trauma.  No fever.    On exam:  GENERAL: not distressed  HENT: nares patent  EYES: no scleral icterus  CV: regular rhythm, regular rate, 2+ right DP pulse  RESPIRATORY: normal effort  ABDOMEN: soft  MUSCULOSKELETAL: no deformity, no focal tenderness to the right leg  NEURO: alert, moves all extremities, follows commands  SKIN: warm, dry, no overlying redness or warmth to the right ankle or leg    Labs  Recent Results (from the past 24 hour(s))   Comprehensive Metabolic Panel    Collection Time: 07/17/24 10:50 AM    Specimen: Arm, Right; Blood   Result Value Ref Range    Glucose 98 65 - 99 mg/dL    BUN 14 8 - 23 mg/dL    Creatinine 0.82 0.57 - 1.00 mg/dL    Sodium 140 136 - 145 mmol/L    Potassium 3.7 3.5 - 5.2 mmol/L    Chloride 104 98 - 107 mmol/L    CO2 25.8 22.0 - 29.0 mmol/L    Calcium 9.8 8.6 - 10.5 mg/dL    Total Protein 7.6 6.0 - 8.5 g/dL    Albumin 4.3 3.5 - 5.2 g/dL    ALT (SGPT) 8 1 - 33 U/L    AST (SGOT) 12 1 - 32 U/L    Alkaline Phosphatase 57 39 - 117 U/L    Total Bilirubin 0.5 0.0 - 1.2 mg/dL    Globulin 3.3 gm/dL    A/G Ratio 1.3 g/dL    BUN/Creatinine Ratio 17.1 7.0 - " 25.0    Anion Gap 10.2 5.0 - 15.0 mmol/L    eGFR 75.6 >60.0 mL/min/1.73   Green Top (Gel)    Collection Time: 07/17/24 10:50 AM   Result Value Ref Range    Extra Tube Hold for add-ons.    Lavender Top    Collection Time: 07/17/24 10:50 AM   Result Value Ref Range    Extra Tube hold for add-on    Gold Top - SST    Collection Time: 07/17/24 10:50 AM   Result Value Ref Range    Extra Tube Hold for add-ons.    Light Blue Top    Collection Time: 07/17/24 10:50 AM   Result Value Ref Range    Extra Tube Hold for add-ons.    CBC Auto Differential    Collection Time: 07/17/24 10:50 AM    Specimen: Arm, Right; Blood   Result Value Ref Range    WBC 7.17 3.40 - 10.80 10*3/mm3    RBC 4.51 3.77 - 5.28 10*6/mm3    Hemoglobin 13.0 12.0 - 15.9 g/dL    Hematocrit 39.8 34.0 - 46.6 %    MCV 88.2 79.0 - 97.0 fL    MCH 28.8 26.6 - 33.0 pg    MCHC 32.7 31.5 - 35.7 g/dL    RDW 13.1 12.3 - 15.4 %    RDW-SD 41.8 37.0 - 54.0 fl    MPV 10.3 6.0 - 12.0 fL    Platelets 288 140 - 450 10*3/mm3    Neutrophil % 59.7 42.7 - 76.0 %    Lymphocyte % 31.7 19.6 - 45.3 %    Monocyte % 5.0 5.0 - 12.0 %    Eosinophil % 2.9 0.3 - 6.2 %    Basophil % 0.4 0.0 - 1.5 %    Immature Grans % 0.3 0.0 - 0.5 %    Neutrophils, Absolute 4.28 1.70 - 7.00 10*3/mm3    Lymphocytes, Absolute 2.27 0.70 - 3.10 10*3/mm3    Monocytes, Absolute 0.36 0.10 - 0.90 10*3/mm3    Eosinophils, Absolute 0.21 0.00 - 0.40 10*3/mm3    Basophils, Absolute 0.03 0.00 - 0.20 10*3/mm3    Immature Grans, Absolute 0.02 0.00 - 0.05 10*3/mm3    nRBC 0.0 0.0 - 0.2 /100 WBC   Duplex Venous Lower Extremity Right    Collection Time: 07/17/24 12:14 PM   Result Value Ref Range    Right Common Femoral Spont Y     Right Common Femoral Competent Y     Right Common Femoral Phasic Y     Right Common Femoral Compress C     Right Common Femoral Augment Y     Right Saphenofemoral Junction Compress C     Right Profunda Femoral Compress C     Right Proximal Femoral Compress C     Right Mid Femoral Spont Y     Right  Mid Femoral Competent Y     Right Mid Femoral Phasic Y     Right Mid Femoral Compress C     Right Mid Femoral Augment Y     Right Distal Femoral Compress C     Right Popliteal Spont Y     Right Popliteal Competent Y     Right Popliteal Phasic Y     Right Popliteal Compress C     Right Popliteal Augment Y     Right Posterior Tibial Compress C     Right Peroneal Compress C     Right Gastronemius Compress C     Right Greater Saph AK Compress C     Right Greater Saph BK Compress C     Right Lesser Saph Compress C     Left Common Femoral Spont Y     Left Common Femoral Competent Y     Left Common Femoral Phasic Y     Left Common Femoral Compress C     Left Common Femoral Augment Y        Radiology  Duplex Venous Lower Extremity Right    Result Date: 7/17/2024    Normal right lower extremity venous duplex scan.      Medications given in the ED:  Medications - No data to display    Orders placed during this visit:  Orders Placed This Encounter   Procedures    [place] Draw    Comprehensive Metabolic Panel    CBC Auto Differential    NPO Diet NPO Type: Strict NPO    Undress & Gown    CBC & Differential    Green Top (Gel)    Lavender Top    Gold Top - SST    Light Blue Top       Medical Decision Making:  ED Course as of 07/20/24 2019 Wed Jul 17, 2024   1107 Patient presents with swelling, pain to the distal right medial lower leg.  Patient had plane flight 2 days ago.  No significant overt calf swelling or tenderness.  Neurovascularly intact distally.  Plan for venous ultrasound. [EE]   1109 WBC: 7.17 [TD]   1109 Hemoglobin: 13.0 [TD]   1216 I discussed ultrasound results with Alondra vascular technician.  Ultrasound is negative for DVT. [TD]   1344 Updated patient on negative workup.  She does complain of some intermittent right thigh and right lower leg pain as well.  Plan to treat for possible sciatica.  She does have an appointment tomorrow with her primary care doctor.  No focal deficits on exam. [EE]      ED Course  User Index  [EE] Arcenio Beasley PA  [TD] Fady Felton II, MD         Diagnosis  Final diagnoses:   Acute right ankle pain          Fady Felton II, MD  07/20/24 2020

## 2024-07-17 NOTE — ED NOTES
Patient to ER via car from home for R leg pain and swelling x 3 days  Recent plane ride  [place]  No blood thinners